# Patient Record
Sex: MALE | Race: ASIAN | NOT HISPANIC OR LATINO | URBAN - METROPOLITAN AREA
[De-identification: names, ages, dates, MRNs, and addresses within clinical notes are randomized per-mention and may not be internally consistent; named-entity substitution may affect disease eponyms.]

---

## 2023-08-30 ENCOUNTER — LAB (OUTPATIENT)
Dept: LAB | Facility: LAB | Age: 28
End: 2023-08-30
Payer: COMMERCIAL

## 2023-10-11 ENCOUNTER — OFFICE VISIT (OUTPATIENT)
Dept: PEDIATRICS | Facility: CLINIC | Age: 28
End: 2023-10-11
Payer: COMMERCIAL

## 2023-10-11 DIAGNOSIS — Z23 ENCOUNTER FOR IMMUNIZATION: ICD-10-CM

## 2023-10-11 PROCEDURE — 90471 IMMUNIZATION ADMIN: CPT | Performed by: PEDIATRICS

## 2023-10-11 PROCEDURE — 90686 IIV4 VACC NO PRSV 0.5 ML IM: CPT | Performed by: PEDIATRICS

## 2023-10-11 NOTE — PROGRESS NOTES
Has the patient already received the influenza vaccine this season?  NO    Is the patient LESS than 6 months in age?  NO    Does the patient have an allergy to the influenza vaccine?  NO    Has the patient received a solid organ transplant in the past 3 months?  NO    Has the patient had anaphylaxis to gelatin or eggs?  NO    Does the patient have an allergy to Gentamicin?  NO    Has the patient been diagnosed with Guillain-Reliance within 6 weeks after a previous flu vaccine?  NO

## 2023-10-11 NOTE — PROGRESS NOTES
Patient ID: Lesvia is here today for the following:     Procedures      1. Encounter for immunization  Flu vaccine (IIV4) age 6 months and greater, preservative free

## 2025-08-06 ENCOUNTER — OFFICE VISIT (OUTPATIENT)
Dept: OTOLARYNGOLOGY | Facility: CLINIC | Age: 30
End: 2025-08-06
Payer: COMMERCIAL

## 2025-08-06 VITALS
OXYGEN SATURATION: 100 % | HEIGHT: 68 IN | DIASTOLIC BLOOD PRESSURE: 74 MMHG | TEMPERATURE: 98.2 F | WEIGHT: 175.13 LBS | RESPIRATION RATE: 16 BRPM | HEART RATE: 69 BPM | BODY MASS INDEX: 26.54 KG/M2 | SYSTOLIC BLOOD PRESSURE: 113 MMHG

## 2025-08-06 DIAGNOSIS — J34.2 NASAL SEPTAL DEVIATION: ICD-10-CM

## 2025-08-06 DIAGNOSIS — J34.3 HYPERTROPHY OF BOTH INFERIOR NASAL TURBINATES: ICD-10-CM

## 2025-08-06 DIAGNOSIS — J34.89 NASAL DRAINAGE: ICD-10-CM

## 2025-08-06 DIAGNOSIS — R09.81 NASAL CONGESTION: Primary | ICD-10-CM

## 2025-08-06 DIAGNOSIS — Z72.820 POOR SLEEP: ICD-10-CM

## 2025-08-06 DIAGNOSIS — J33.1 POLYPOID SINUS DEGENERATION: ICD-10-CM

## 2025-08-06 DIAGNOSIS — J31.0 CHRONIC RHINITIS: ICD-10-CM

## 2025-08-06 PROCEDURE — 3008F BODY MASS INDEX DOCD: CPT | Performed by: STUDENT IN AN ORGANIZED HEALTH CARE EDUCATION/TRAINING PROGRAM

## 2025-08-06 PROCEDURE — 31231 NASAL ENDOSCOPY DX: CPT | Performed by: STUDENT IN AN ORGANIZED HEALTH CARE EDUCATION/TRAINING PROGRAM

## 2025-08-06 PROCEDURE — 99203 OFFICE O/P NEW LOW 30 MIN: CPT | Performed by: STUDENT IN AN ORGANIZED HEALTH CARE EDUCATION/TRAINING PROGRAM

## 2025-08-06 PROCEDURE — 99202 OFFICE O/P NEW SF 15 MIN: CPT

## 2025-08-06 PROCEDURE — 1036F TOBACCO NON-USER: CPT | Performed by: STUDENT IN AN ORGANIZED HEALTH CARE EDUCATION/TRAINING PROGRAM

## 2025-08-06 RX ORDER — AZELASTINE 1 MG/ML
2 SPRAY, METERED NASAL DAILY
Qty: 30 ML | Refills: 11 | Status: SHIPPED | OUTPATIENT
Start: 2025-08-06 | End: 2026-08-06

## 2025-08-06 RX ORDER — TRIAMCINOLONE ACETONIDE 55 UG/1
2 SPRAY, METERED NASAL DAILY
Qty: 16.5 G | Refills: 11 | Status: SHIPPED | OUTPATIENT
Start: 2025-08-06 | End: 2026-08-06

## 2025-08-06 ASSESSMENT — ENCOUNTER SYMPTOMS
LOSS OF SENSATION IN FEET: 0
DEPRESSION: 0
OCCASIONAL FEELINGS OF UNSTEADINESS: 0

## 2025-08-06 ASSESSMENT — PAIN SCALES - GENERAL: PAINLEVEL_OUTOF10: 0-NO PAIN

## 2025-08-06 NOTE — PATIENT INSTRUCTIONS
"Please start using triamcinolone nasal spray, 2 sprays in each nostril every night.  If after 6 to 8 weeks you have still not experience significant improvement continue this nasal spray but also add azelastine nasal spray, 2 sprays in each nostril every night.  Consider using saline irrigations prior to use of the nasal sprays.  Try to limit oxymetazoline use as you are able.    ---------------------------------------------------------    NASAL STEROID SPRAY INSTRUCTIONS    Please take the prescribed nasal spray as directed. BE SURE TO POINT THE SPRAY TOWARDS THE CORNER OF THE EYE ON THE SAME SIDE NOSTRIL. This will ensure you are treating the appropriate parts of your nose that are swollen or inflamed.    This medication will take upwards of one month before you notice full benefit. You MUST use it every day for it to be effective.    SALINE IRRIGATION INSTRUCTIONS    The Benefits:  When you irrigate, or \"rinse,\" your nose the isotonic saline (salt water) washes mucous, crusts, and other debris from your nose (allergens, irritants from the environment) that may be contributing to your symptoms. It also helps prepare the lining of your nose for any nasal medications you may be taking.    Instructions: Stand over the sink with your head forward over the sink or in the bathtub to prevent water from going down into your throat. The goal is to get the irrigation back out of the opposite nostril after irrigation. Irrigate each side of the nose with 4 oz (about 120 milliliters) 1-3 times/day. You may buy the salt packets that come with the [employer] irrigation bottle or use recipe provided below to make your own nasal saline. Irrigate each side of the nose with mild force/squeezing of bottle. If you feel like the solution is going into your ears adjust your head angle or use less force with the bottle. The first couple times you use the solution your nose may burn a bit but this will go away with time.    Recipe to Make " Own Nasal Saline Solution:  Mix 8 oz of tap water (be sure to boil it then let it cool down) or distilled water with 1/2 tsp of baking soda and 1/2 tsp of table salt and shake bottle to dissolve.    - If you are using a steroid nasal spray in addition to the irrigation, irrigate first, then use the topical steroid spray otherwise you will wash the steroid out of your nose with the irrigation   - Optimal use is twice per day  - Irrigations have been proven to be more effective compared with salt water spray  - Nasal irrigations performed with a large volume and delivered with low positive pressure are more effective than saline sprays over an 8-week period for treatment of chronic nasal and sinus symptoms    Reference: Carlota CARTWRIGHT et al. Nasal saline irrigations for the symptoms of acute and chronic rhinosinusitis. Arch OtolaryngolHeadNeckSurg. 2007;133(11):4019-9593.

## 2025-08-06 NOTE — PROGRESS NOTES
SUBJECTIVE  Patient ID: Lesvia Collado is a 30 y.o. male who presents for New Patient Visit (Chronic nasal congestion).    The patient reports longstanding nasal congestion. Wonders whether it is affecting his sleep; seems blocked in the morning when waking. Snores per his wife. Wakes not feeling well rested. No known apneic episode. Has young daughter but sleeps through the night; co-sleeps.  They deny nasal drainage, epistaxis, and change in sense of smell. They endorse facial pressure in the morning. They deny a history of environmental allergies; no formal allergy testing. No asthma. They deny a history of nasal/sinus surgery/trauma.  The only medication that works well is Afrin; using it frequently. Thinks he may have tried Flonase briefly in the past worried it triggered migraines. Cetrizine was not helpful.    Review of Systems  Complete ROS negative except as noted above or on patient intake form and as above.    OBJECTIVE  Physical Exam  CONSTITUTIONAL: Well appearing male who appears stated age. BMI 26.6.  PSYCHIATRIC: Alert, appropriate mood and affect.  RESPIRATORY: Normal inspiration and expiration and chest wall expansion; no use of accessory muscles to breathe.  VOICE: Clear speech without hoarseness. No stridor nor stertor.  HEAD, FACE, AND SKIN: Symmetric facial feature. No cutaneous masses or lesions were visualized. The parotid and submandibular glands were normal to palpation.  EYES: Pupils were equal in size. Extra-ocular muscle function was intact. No nystagmus was observed. Vision was grossly intact.  EARS: External ears were normally formed with no lesions. The external auditory canals were clear. The tympanic membranes were intact and in the neutral position. No significant retraction pockets nor effusions were appreciated.  NOSE: Nasal dorsum was midline. Anterior rhinoscopy demonstrated a septum deviated to the right superiorly and caudally. Inferior turbinates were severely hypertrophied  on the right and mildly on the left. No obvious nasal masses, polyps, mucopurulence, nor other lesions were appreciated.  ORAL CAVITY: Lips were without lesions. Moist mucous membranes. No lesions appreciated along the gingiva, oral mucosa, nor tongue. Tongue scalloping appreciated.  DENTITION: Grossly normal without obvious infection nor inflammation. Class I occlusion.  OROPHARYNX: No lesion nor mucosal abnormality. The uvula was normal appearing. The tonsils were 1-2+. Graff class III airway.  NECK: Visualization and palpation of the neck revealed no mass lesions, no thyromegaly or thyroid masses. No cutaneous lesions appreciated.  LYMPHATICS (CERVICAL): There were no palpable lymph nodes in the posterior triangle, submandibular triangle, jugulodigastric region, nor central neck.  NEUROLOGIC: Cranial nerves III, IV, and VI were noted to be intact via extra-ocular muscle movement testing. Cranial nerve V was noted to be intact to soft touch bilaterally. Cranial nerve VII was noted to be intact and symmetric by facial movement. Cranial nerve VIII was tested with normal voice examination and revealed grossly normal hearing. Cranial nerves IX and X noted to be intact by palatal movement. Cranial nerve XI noted to be intact via shoulder shrug.  Cranial nerve XII noted to be intact with active and symmetric tongue movement.     --------------------------------------------------  Procedure: Nasal endoscopy - Diagnostic  Indication: Chronic rhinitis, nasal congestion  Informed consent verbally obtained: The risks, benefits, alternatives, and expectations were discussed with the patient, who wished to proceed  Anesthesia: Topical lidocaine/oxymetazoline    Findings: After topical anesthetic was applied the nasal cavities were examined with a flexible endoscope. The septum was deviated to the right superiorly as well as posteriorly with septal spurring at the middle meatus.  - Right: The inferior turbinate was severely  hypertrophied. The middle turbinate appeared mildly edematous; there is polypoid edema overlying the uncinate process at the middle meatus.  Thick clear drainage appreciated along the floor the nasal cavity.  The spheno-ethmoid recess was free of purulence, masses, lesions, or polyps. The nasal passageway is narrowed by septal deviation and edema.  - Left: The inferior turbinate was mildly hypertrophied. The middle turbinate appeared healthy; the middle meatus and spheno-ethmoid recess were free of purulence, masses, lesions, or polyps. The nasal passageway is patent.    The nasopharynx was clear.  Small adenoid pad present.  No pharyngeal/laryngeal lesions or masses.  Mild pharyngeal cobblestoning noted.  Bilateral and symmetric vocal fold motion.  Mild glottic and interarytenoid edema concerning for measure of LPR; potential pseudosulcus appreciated.    There were no complications and the patient tolerated the procedure well.  --------------------------------------------------    ASSESSMENT/PLAN  Diagnoses and all orders for this visit:  Chronic rhinitis  -     triamcinolone (Nasacort) 55 mcg nasal inhaler; Administer 2 sprays into each nostril once daily.  -     azelastine (Astelin) 137 mcg (0.1 %) nasal spray; Administer 2 sprays into each nostril once daily. Use in each nostril as directed  Nasal drainage  Nasal congestion  Nasal septal deviation  Hypertrophy of both inferior nasal turbinates  Polypoid sinus degeneration  Poor sleep      30 y.o. male presenting with longstanding nasal congestion.    Chronic rhinitis, nasal congestion/drainage, polypoid sinus degeneration, NSD/ITH  The patient is noting longstanding nasal congestion, typically worse/more noticeable in the evenings.  On exam they have significant right sided inferior turbinate hypertrophy.  There is right nasal septal deviation with prominent superior component; somewhat short nose.    Endoscopy: Polypoid sinus degeneration at the right middle  meatus.  Thick clear drainage.  Right nasal septal deviation.  Imaging: Not available.    I discussed my findings with the patient and offered reassurance and counseling. The patient has evidence of chronic inflammation of the nasal cavity as appreciated on physical exam and nasal endoscopy. I recommended the following therapeutic measures to address the issues noted above:    -Start nasal steroid spray 2 sprays daily bilaterally. I recommended at least a one-month trial.  -Consider addition of azelastine nasal spray at 6 to 8 weeks if no improvement.  -Start sinonasal saline irrigations to be performed prior to application of nasal steroid spray. Ideally this will be performed daily.  -Follow up in 3 months.  -We will discuss further options should medical management not succeed.  Consider allergy testing, imaging, and/or procedural intervention.  If there is a prominent sinus component to his symptoms he may need staged surgeries given his caudal and superior septal deviation.    General fatigue, poor sleep, snoring  The patient's wife also reports that he snores.  He notes general fatigue and poor sleep quality of unclear etiology.  He is hopeful that improvement in his nasal congestion will help him with his sleep.  We discussed that symptoms are also concerning for potential sleep apnea component.  Will continue work on his nasal obstruction, but with improvement if he notes persistent sleep trouble we should consider testing.    This note was created using speech recognition transcription software. Despite proofreading, typographical errors may be present that affect the meaning of the content. Please contact my office with any questions.

## 2025-10-15 ENCOUNTER — APPOINTMENT (OUTPATIENT)
Dept: OTOLARYNGOLOGY | Facility: CLINIC | Age: 30
End: 2025-10-15
Payer: COMMERCIAL